# Patient Record
Sex: FEMALE | NOT HISPANIC OR LATINO | Employment: FULL TIME | URBAN - NONMETROPOLITAN AREA
[De-identification: names, ages, dates, MRNs, and addresses within clinical notes are randomized per-mention and may not be internally consistent; named-entity substitution may affect disease eponyms.]

---

## 2017-02-16 ENCOUNTER — OFFICE VISIT (OUTPATIENT)
Dept: GASTROENTEROLOGY | Facility: CLINIC | Age: 32
End: 2017-02-16

## 2017-02-16 VITALS
HEIGHT: 63 IN | DIASTOLIC BLOOD PRESSURE: 87 MMHG | RESPIRATION RATE: 14 BRPM | HEART RATE: 83 BPM | TEMPERATURE: 98.4 F | BODY MASS INDEX: 31.71 KG/M2 | WEIGHT: 179 LBS | SYSTOLIC BLOOD PRESSURE: 126 MMHG

## 2017-02-16 DIAGNOSIS — R10.13 EPIGASTRIC PAIN: Primary | Chronic | ICD-10-CM

## 2017-02-16 DIAGNOSIS — K62.5 BRIGHT RED BLOOD PER RECTUM: Chronic | ICD-10-CM

## 2017-02-16 DIAGNOSIS — R19.7 DIARRHEA, UNSPECIFIED TYPE: ICD-10-CM

## 2017-02-16 DIAGNOSIS — R13.14 PHARYNGOESOPHAGEAL DYSPHAGIA: Chronic | ICD-10-CM

## 2017-02-16 DIAGNOSIS — R11.0 NAUSEA: Chronic | ICD-10-CM

## 2017-02-16 DIAGNOSIS — K59.00 CONSTIPATION, UNSPECIFIED CONSTIPATION TYPE: Chronic | ICD-10-CM

## 2017-02-16 DIAGNOSIS — R12 HEARTBURN: Chronic | ICD-10-CM

## 2017-02-16 PROBLEM — R10.11 RIGHT UPPER QUADRANT ABDOMINAL PAIN: Status: ACTIVE | Noted: 2017-02-16

## 2017-02-16 PROBLEM — R10.33 PERIUMBILICAL ABDOMINAL PAIN: Status: ACTIVE | Noted: 2017-02-16

## 2017-02-16 PROCEDURE — 99204 OFFICE O/P NEW MOD 45 MIN: CPT | Performed by: NURSE PRACTITIONER

## 2017-02-16 RX ORDER — PROMETHAZINE HYDROCHLORIDE 25 MG/1
25 TABLET ORAL EVERY 6 HOURS PRN
COMMUNITY
Start: 2016-12-22

## 2017-02-16 RX ORDER — CITALOPRAM 20 MG/1
10 TABLET ORAL DAILY
COMMUNITY
Start: 2017-01-02

## 2017-02-16 RX ORDER — CITALOPRAM 10 MG/1
10 TABLET ORAL DAILY
COMMUNITY
End: 2017-03-07 | Stop reason: SDUPTHER

## 2017-02-16 RX ORDER — LEVOTHYROXINE AND LIOTHYRONINE 76; 18 UG/1; UG/1
120 TABLET ORAL DAILY
COMMUNITY

## 2017-02-16 NOTE — PATIENT INSTRUCTIONS
1. Antireflux measures: Avoid fried, fatty foods, alcohol, chocolate, coffee, tea,  soft drinks, peppermint and spearmint, spicy foods, tomatoes and tomato based foods, onion based foods, and smoking. Other antireflux measures include weight reduction if overweight, avoiding tight clothing around the abdomen, elevating the head of the bed 6 inches with blocks under the head board, and don't drink or eat before going to bed and avoid lying down immediately after meals.  2. Nexium 20 mg 1 tablet in the morning 30 minutes before breakfast.  3. Recommend taking Levothyroxine first in the am, wait 30 minutes, then take Nexium, wait 30 minutes, then may eat breakfast and take other medications.  4. Phenergan 25 mg 1/2-1 tablet every 8 hours as needed for nausea.  5. Check CBC, CMP, TSH, Lipase  6. Upper endoscopy-EGD: Description of the procedure, risks, benefits, alternatives and options, including nonoperative options, were discussed with the patient in detail. The patient understands and wishes to proceed.  7. Colonoscopy: Description of the procedure, risks, benefits, alternatives and options, including nonoperative options, were discussed with the patient in detail. The patient understands and wishes to proceed.

## 2017-02-16 NOTE — PROGRESS NOTES
"[address] [facility] [place] [address]    (H) [phone]  (W)     Chief Complaint   Patient presents with   • Abdominal Pain   • Diarrhea   • Constipation   • Heartburn   • Nausea   • Rectal Bleeding     The patient states she is here for evaluation of \"attacks\" she has been having over the past 1 year. She states she has had  episodes of abdominal pain, nausea, vomiting, and constipation approximately 9 times over the past year. The \"episodes\" last up to 3-4 days. She states she has had CT scans, ultrasounds, HIDA scans and nothing has explained her episodes. She has tried Bentyl but it did not help, so she has stopped this. . She states she has been taking Nexium, but it has not prevented the \"attacks\" and has not improved nausea. She states it is not helping very much with her heartburn at this time. She states she had never had problems with heartburn until this past year.     Currently the patient is having constipation for 1-2 days, then she may have a difficult time passing stools. On occasion, she may not have a bowel movement for several days, then she will randomly have diarrhea. She states she has woke up cramping in the middle of the night and will have diarrhea. She states no one else in her home is sick. There is a history of taking Augmentin for possible diverticulitis in the middle of December 2016, but the patient states she had been having the diarrhea for many months prior to taking the antibiotics. The patient states she may have bright red blood per rectum 1-2 times per week. No melena.    There is a history of intermittent difficulty swallowing liquids. The patient states she has episodes where some becomes \"choked\" on swallowing liquids. No difficulties with solids.    The patient has not had a colonoscopy in the past. There is no family history of colon cancer.    Abdominal Pain   This is a recurrent problem. The current episode started more than 1 month ago. The onset quality is sudden. " The problem occurs intermittently. The most recent episode lasted 1 day. The problem has been unchanged. The pain is located in the RUQ and periumbilical region. The pain is at a severity of 5/10. The pain is moderate. The quality of the pain is sharp. The abdominal pain does not radiate. Associated symptoms include constipation, diarrhea, headaches, hematochezia and nausea. Pertinent negatives include no arthralgias, dysuria, fever, hematuria, melena, myalgias or vomiting. Nothing aggravates the pain. The pain is relieved by nothing. She has tried proton pump inhibitors for the symptoms. The treatment provided no relief. Her past medical history is significant for GERD.   Diarrhea    This is a recurrent problem. The current episode started more than 1 month ago. The problem occurs less than 2 times per day. The problem has been unchanged. The stool consistency is described as watery. The patient states that diarrhea awakens her from sleep. Associated symptoms include abdominal pain and headaches. Pertinent negatives include no arthralgias, chills, coughing, fever, myalgias or vomiting. Nothing aggravates the symptoms. There are no known risk factors. Treatments tried: Bentyl. The treatment provided no relief.   Constipation   This is a recurrent problem. The current episode started more than 1 month ago. The problem is unchanged. Her stool frequency is 1 time per day. The stool is described as loose and firm. The patient is not on a high fiber diet. She does not exercise regularly. There has not been adequate water intake. Associated symptoms include abdominal pain, diarrhea, hematochezia and nausea. Pertinent negatives include no fever, melena or vomiting. She has tried nothing for the symptoms.   Heartburn   She complains of abdominal pain, heartburn and nausea. She reports no chest pain or no coughing. This is a recurrent problem. The current episode started more than 1 month ago. The problem occurs  occasionally. The problem has been unchanged. The heartburn duration is an hour. The heartburn is located in the substernum. The heartburn is of moderate intensity. The heartburn does not wake her from sleep. The heartburn does not limit her activity. The heartburn doesn't change with position. Nothing aggravates the symptoms. Pertinent negatives include no fatigue or melena. There are no known risk factors. She has tried a PPI for the symptoms. The treatment provided mild relief.   Nausea   This is a recurrent problem. The current episode started more than 1 month ago. The problem occurs intermittently. The problem has been unchanged. Associated symptoms include abdominal pain, headaches and nausea. Pertinent negatives include no arthralgias, chest pain, chills, coughing, fatigue, fever, joint swelling, myalgias, rash, vertigo or vomiting. Nothing aggravates the symptoms. She has tried nothing for the symptoms.   Rectal Bleeding   This is a recurrent problem. The current episode started more than 1 month ago. The problem occurs intermittently (1-2 times per week). The problem has been unchanged. Associated symptoms include abdominal pain, headaches and nausea. Pertinent negatives include no arthralgias, chest pain, chills, coughing, fatigue, fever, joint swelling, myalgias, rash, vertigo or vomiting. Nothing aggravates the symptoms. She has tried nothing for the symptoms.   Difficulty Swallowing   This is a recurrent problem. The current episode started more than 1 month ago. The problem occurs intermittently. The problem has been unchanged. Associated symptoms include abdominal pain, headaches and nausea. Pertinent negatives include no arthralgias, chest pain, chills, coughing, fatigue, fever, joint swelling, myalgias, rash, vertigo or vomiting. The symptoms are aggravated by drinking. She has tried nothing for the symptoms.     Review of Systems   Constitutional: Negative for appetite change, chills, fatigue,  fever and unexpected weight change.   HENT: Positive for trouble swallowing. Negative for mouth sores and nosebleeds.    Eyes: Negative for discharge and redness.   Respiratory: Negative for apnea, cough and shortness of breath.    Cardiovascular: Positive for palpitations. Negative for chest pain and leg swelling.   Gastrointestinal: Positive for abdominal pain, constipation, diarrhea, heartburn, hematochezia and nausea. Negative for abdominal distention, anal bleeding, blood in stool, melena and vomiting.   Endocrine: Negative for cold intolerance, heat intolerance and polydipsia.   Genitourinary: Negative for dysuria, hematuria and urgency.   Musculoskeletal: Negative for arthralgias, joint swelling and myalgias.   Skin: Negative for rash.   Allergic/Immunologic: Negative for food allergies and immunocompromised state.   Neurological: Positive for headaches. Negative for dizziness, vertigo, seizures and syncope.   Hematological: Negative for adenopathy. Does not bruise/bleed easily.   Psychiatric/Behavioral: Negative for dysphoric mood. The patient is nervous/anxious. The patient is not hyperactive.      Patient Active Problem List   Diagnosis   • Right upper quadrant abdominal pain   • Periumbilical abdominal pain   • Heartburn   • Nausea   • Constipation   • Diarrhea   • Bright red blood per rectum   • Pharyngoesophageal dysphagia     Past Medical History   Diagnosis Date   • Hypothyroid 2006   • Snores    • Tattoo      Past Surgical History   Procedure Laterality Date   • Abdominal surgery  1995     lap     Family History   Problem Relation Age of Onset   • Colon polyps Father    • ADRIANA disease Maternal Grandmother    • Diverticulitis Maternal Grandmother    • Hiatal hernia Maternal Grandmother    • Liver cancer Neg Hx    • Liver disease Neg Hx    • Stomach cancer Neg Hx    • Esophageal cancer Neg Hx      Social History   Substance Use Topics   • Smoking status: Former Smoker   • Smokeless tobacco: Not on file  "     Comment: quit in 2011   • Alcohol use Yes      Comment: social       Current Outpatient Prescriptions:   •  citalopram (CeleXA) 10 MG tablet, Take 10 mg by mouth Daily., Disp: , Rfl:   •  citalopram (CeleXA) 20 MG tablet, , Disp: , Rfl:   •  esomeprazole (nexIUM) 20 MG capsule, Take 20 mg by mouth Every Morning Before Breakfast., Disp: , Rfl:   •  promethazine (PHENERGAN) 25 MG tablet, , Disp: , Rfl:   •  Thyroid 120 MG PO tablet, Take 120 mg by mouth Daily., Disp: , Rfl:      No Known Allergies     Visit Vitals   • /87   • Pulse 83   • Temp 98.4 °F (36.9 °C)   • Resp 14   • Ht 63\" (160 cm)   • Wt 179 lb (81.2 kg)   • LMP 01/25/2017   • BMI 31.71 kg/m2     Physical Exam   Constitutional: She is oriented to person, place, and time. She appears well-developed and well-nourished. No distress.   HENT:   Head: Normocephalic and atraumatic.   Right Ear: Hearing and external ear normal.   Left Ear: Hearing and external ear normal.   Nose: Nose normal.   Mouth/Throat: Oropharynx is clear and moist and mucous membranes are normal. Mucous membranes are not pale, not dry and not cyanotic. No oral lesions. No oropharyngeal exudate.   Eyes: Conjunctivae and EOM are normal. Right eye exhibits no discharge. Left eye exhibits no discharge.   Neck: Trachea normal. Neck supple. No JVD present. No edema present. No thyroid mass and no thyromegaly present.   Cardiovascular: Normal rate, regular rhythm, S2 normal and normal heart sounds.  Exam reveals no gallop, no S3 and no friction rub.    No murmur heard.  Pulmonary/Chest: Effort normal and breath sounds normal. No respiratory distress. She exhibits no tenderness.   Abdominal: Normal appearance and bowel sounds are normal. She exhibits no distension, no ascites and no mass. There is no splenomegaly or hepatomegaly. There is tenderness (mild to moderate) in the epigastric area. There is no rigidity, no rebound and no guarding. No hernia.       Vascular Status -  Her exam " exhibits no right foot edema. Her exam exhibits no left foot edema.  Lymphadenopathy:     She has no cervical adenopathy.        Left: No supraclavicular adenopathy present.   Neurological: She is alert and oriented to person, place, and time. She has normal strength. No cranial nerve deficit or sensory deficit.   Skin: No rash noted. She is not diaphoretic. No cyanosis. No pallor. Nails show no clubbing.   Psychiatric: She has a normal mood and affect.   Nursing note and vitals reviewed.    Laboratory Tests:    Upon review of records:    Dated 12/22/2016 H. pylori IgM antibody:<9.0    Abdominal Imaging:    Upon review of records:    Ultrasound dated 12/14/2016 reveals liver parenchyma is homogenous.  There are no focal masses identified.  The gallbladder appears within normal limits.  There are no echogenic foci to suggest sounds.  There is no gallbladder wall thickening or pericholecystic fluid.  The common duct is within normal limits arguing against biliary dilatation.    Kanika was seen today for abdominal pain, diarrhea, constipation, heartburn, nausea and rectal bleeding.    Diagnoses and all orders for this visit:    Epigastric pain  Comments:  Differentials include peptic ulcer disease, pancreatobiliary disease.  Orders:  -     CBC & Differential  -     Comprehensive Metabolic Panel  -     Lipase  -     TSH    Heartburn  Comments:  Not well controlled with Nexium 20 mg daily.  Concerns for underlying Hernandez's.    Nausea  Comments:  Differentials include peptic ulcer disease, pancreatobiliary disease.  Of interest patient is not able to tolerate Zofran due to headaches.  Orders:  -     CBC & Differential  -     Comprehensive Metabolic Panel  -     Lipase  -     TSH    Constipation, unspecified constipation type  Comments:  History of recurrent constipation alternating with diarrhea.  Orders:  -     CBC & Differential  -     Comprehensive Metabolic Panel  -     Lipase  -     TSH    Diarrhea, unspecified  type  Comments:  Differentials include microscopic colitis, underlying inflammatory bowel disease.  Orders:  -     CBC & Differential  -     Comprehensive Metabolic Panel  -     Lipase  -     TSH    Bright red blood per rectum  Comments:  Differentials include hemorrhoids, fissure, vascular ectasia, underlying inflammatory bowel disease.  Orders:  -     CBC & Differential  -     Comprehensive Metabolic Panel  -     Lipase  -     TSH    Pharyngoesophageal dysphagia  Comments:  History of dysphagia symptoms with liquids.  No difficulty with solids.  Differentials include esophageal dysmotility, esophagitis.       Plan   Patient Instructions   1. Antireflux measures: Avoid fried, fatty foods, alcohol, chocolate, coffee, tea,  soft drinks, peppermint and spearmint, spicy foods, tomatoes and tomato based foods, onion based foods, and smoking. Other antireflux measures include weight reduction if overweight, avoiding tight clothing around the abdomen, elevating the head of the bed 6 inches with blocks under the head board, and don't drink or eat before going to bed and avoid lying down immediately after meals.  2. Nexium 20 mg 1 tablet in the morning 30 minutes before breakfast.  3. Recommend taking Levothyroxine first in the am, wait 30 minutes, then take Nexium, wait 30 minutes, then may eat breakfast and take other medications.  4. Phenergan 25 mg 1/2-1 tablet every 8 hours as needed for nausea.  5. Check CBC, CMP, TSH, Lipase  6. Upper endoscopy-EGD: Description of the procedure, risks, benefits, alternatives and options, including nonoperative options, were discussed with the patient in detail. The patient understands and wishes to proceed.  7. Colonoscopy: Description of the procedure, risks, benefits, alternatives and options, including nonoperative options, were discussed with the patient in detail. The patient understands and wishes to proceed.    Patient Care Team:  RAMILA Garcia as PCP - General  (Family Medicine)    Maylin Albert, APRN

## 2017-02-20 ENCOUNTER — PREP FOR SURGERY (OUTPATIENT)
Dept: GASTROENTEROLOGY | Facility: CLINIC | Age: 32
End: 2017-02-20

## 2017-02-20 DIAGNOSIS — R19.7 DIARRHEA, UNSPECIFIED TYPE: ICD-10-CM

## 2017-02-20 DIAGNOSIS — K62.5 BRIGHT RED BLOOD PER RECTUM: ICD-10-CM

## 2017-02-20 DIAGNOSIS — K59.00 CONSTIPATION, UNSPECIFIED CONSTIPATION TYPE: Primary | ICD-10-CM

## 2017-02-20 RX ORDER — SODIUM CHLORIDE 0.9 % (FLUSH) 0.9 %
1-10 SYRINGE (ML) INJECTION AS NEEDED
Status: CANCELLED | OUTPATIENT
Start: 2017-02-20

## 2017-02-20 RX ORDER — SODIUM CHLORIDE 9 MG/ML
70 INJECTION, SOLUTION INTRAVENOUS CONTINUOUS PRN
Status: CANCELLED | OUTPATIENT
Start: 2017-02-20

## 2017-03-08 ENCOUNTER — TELEPHONE (OUTPATIENT)
Dept: GASTROENTEROLOGY | Facility: CLINIC | Age: 32
End: 2017-03-08

## 2017-03-09 ENCOUNTER — PREP FOR SURGERY (OUTPATIENT)
Dept: GASTROENTEROLOGY | Facility: CLINIC | Age: 32
End: 2017-03-09

## 2017-03-09 ENCOUNTER — TELEPHONE (OUTPATIENT)
Dept: GASTROENTEROLOGY | Facility: CLINIC | Age: 32
End: 2017-03-09

## 2017-03-09 NOTE — TELEPHONE ENCOUNTER
Patient aware, She is informed that she needs to make an appt and fup with her PCP for below normal thyroid levels.  She states understanding.

## 2017-03-09 NOTE — TELEPHONE ENCOUNTER
----- Message from RAMILA Merritt sent at 3/8/2017  1:45 PM EST -----  Please let patient know her TSH (thyroid) is below normal. She needs to follow up with her PCP regarding this. Other labs were ok.    Thank you,  Maylin    ----- Message -----     From: Jasmyn Valdes     Sent: 3/8/2017   1:10 PM       To: RAMILA Merritt    PT RETURNED MISSED CALL FROM YOU, [phone]

## 2017-03-10 ENCOUNTER — ANESTHESIA EVENT (OUTPATIENT)
Dept: GASTROENTEROLOGY | Facility: HOSPITAL | Age: 32
End: 2017-03-10

## 2017-03-10 ENCOUNTER — HOSPITAL ENCOUNTER (OUTPATIENT)
Facility: HOSPITAL | Age: 32
Setting detail: HOSPITAL OUTPATIENT SURGERY
Discharge: HOME | End: 2017-03-10
Attending: INTERNAL MEDICINE | Admitting: INTERNAL MEDICINE

## 2017-03-10 ENCOUNTER — ANESTHESIA (OUTPATIENT)
Dept: GASTROENTEROLOGY | Facility: HOSPITAL | Age: 32
End: 2017-03-10

## 2017-03-10 VITALS
HEART RATE: 74 BPM | RESPIRATION RATE: 19 BRPM | TEMPERATURE: 98 F | SYSTOLIC BLOOD PRESSURE: 111 MMHG | WEIGHT: 179 LBS | OXYGEN SATURATION: 100 % | DIASTOLIC BLOOD PRESSURE: 64 MMHG | HEIGHT: 63 IN | BODY MASS INDEX: 31.71 KG/M2

## 2017-03-10 DIAGNOSIS — R19.7 DIARRHEA, UNSPECIFIED TYPE: ICD-10-CM

## 2017-03-10 DIAGNOSIS — K62.5 BRIGHT RED BLOOD PER RECTUM: ICD-10-CM

## 2017-03-10 DIAGNOSIS — K59.00 CONSTIPATION, UNSPECIFIED CONSTIPATION TYPE: ICD-10-CM

## 2017-03-10 LAB — B-HCG UR QL: NEGATIVE

## 2017-03-10 PROCEDURE — 25010000002 PROPOFOL 200 MG/20ML EMULSION: Performed by: NURSE ANESTHETIST, CERTIFIED REGISTERED

## 2017-03-10 PROCEDURE — 25010000002 ONDANSETRON PER 1 MG: Performed by: NURSE ANESTHETIST, CERTIFIED REGISTERED

## 2017-03-10 PROCEDURE — 25010000002 MEPERIDINE PER 100 MG: Performed by: NURSE ANESTHETIST, CERTIFIED REGISTERED

## 2017-03-10 PROCEDURE — 45384 COLONOSCOPY W/LESION REMOVAL: CPT | Performed by: INTERNAL MEDICINE

## 2017-03-10 PROCEDURE — 81025 URINE PREGNANCY TEST: CPT | Performed by: NURSE PRACTITIONER

## 2017-03-10 PROCEDURE — 25010000002 ONDANSETRON PER 1 MG

## 2017-03-10 PROCEDURE — 25010000002 MIDAZOLAM PER 1 MG: Performed by: NURSE ANESTHETIST, CERTIFIED REGISTERED

## 2017-03-10 PROCEDURE — 43239 EGD BIOPSY SINGLE/MULTIPLE: CPT | Performed by: INTERNAL MEDICINE

## 2017-03-10 RX ORDER — SODIUM CHLORIDE 0.9 % (FLUSH) 0.9 %
1-10 SYRINGE (ML) INJECTION AS NEEDED
Status: DISCONTINUED | OUTPATIENT
Start: 2017-03-10 | End: 2017-03-10 | Stop reason: HOSPADM

## 2017-03-10 RX ORDER — PANTOPRAZOLE SODIUM 40 MG/10ML
40 INJECTION, POWDER, LYOPHILIZED, FOR SOLUTION INTRAVENOUS
Status: DISCONTINUED | OUTPATIENT
Start: 2017-03-11 | End: 2017-03-10 | Stop reason: HOSPADM

## 2017-03-10 RX ORDER — ONDANSETRON 2 MG/ML
INJECTION INTRAMUSCULAR; INTRAVENOUS
Status: COMPLETED
Start: 2017-03-10 | End: 2017-03-10

## 2017-03-10 RX ORDER — SODIUM CHLORIDE 9 MG/ML
70 INJECTION, SOLUTION INTRAVENOUS CONTINUOUS PRN
Status: DISCONTINUED | OUTPATIENT
Start: 2017-03-10 | End: 2017-03-10 | Stop reason: HOSPADM

## 2017-03-10 RX ORDER — PROPOFOL 10 MG/ML
INJECTION, EMULSION INTRAVENOUS AS NEEDED
Status: DISCONTINUED | OUTPATIENT
Start: 2017-03-10 | End: 2017-03-10 | Stop reason: SURG

## 2017-03-10 RX ORDER — ESOMEPRAZOLE MAGNESIUM 40 MG/1
CAPSULE, DELAYED RELEASE ORAL
Qty: 30 CAPSULE | Refills: 3 | Status: SHIPPED | OUTPATIENT
Start: 2017-03-10 | End: 2017-04-11

## 2017-03-10 RX ORDER — MEPERIDINE HYDROCHLORIDE 50 MG/ML
INJECTION INTRAMUSCULAR; INTRAVENOUS; SUBCUTANEOUS AS NEEDED
Status: DISCONTINUED | OUTPATIENT
Start: 2017-03-10 | End: 2017-03-10 | Stop reason: SURG

## 2017-03-10 RX ORDER — ONDANSETRON 2 MG/ML
4 INJECTION INTRAMUSCULAR; INTRAVENOUS ONCE AS NEEDED
Status: COMPLETED | OUTPATIENT
Start: 2017-03-10 | End: 2017-03-10

## 2017-03-10 RX ORDER — MIDAZOLAM HYDROCHLORIDE 1 MG/ML
INJECTION INTRAMUSCULAR; INTRAVENOUS AS NEEDED
Status: DISCONTINUED | OUTPATIENT
Start: 2017-03-10 | End: 2017-03-10 | Stop reason: SURG

## 2017-03-10 RX ADMIN — PROPOFOL 50 MG: 10 INJECTION, EMULSION INTRAVENOUS at 11:39

## 2017-03-10 RX ADMIN — PROPOFOL 50 MG: 10 INJECTION, EMULSION INTRAVENOUS at 12:15

## 2017-03-10 RX ADMIN — PROPOFOL 50 MG: 10 INJECTION, EMULSION INTRAVENOUS at 11:40

## 2017-03-10 RX ADMIN — SODIUM CHLORIDE 70 ML/HR: 9 INJECTION, SOLUTION INTRAVENOUS at 09:44

## 2017-03-10 RX ADMIN — ONDANSETRON 4 MG: 2 INJECTION INTRAMUSCULAR; INTRAVENOUS at 11:30

## 2017-03-10 RX ADMIN — PROPOFOL 30 MG: 10 INJECTION, EMULSION INTRAVENOUS at 12:00

## 2017-03-10 RX ADMIN — ONDANSETRON 4 MG: 2 INJECTION INTRAMUSCULAR; INTRAVENOUS at 11:00

## 2017-03-10 RX ADMIN — MEPERIDINE HYDROCHLORIDE 50 MG: 50 INJECTION INTRAMUSCULAR; INTRAVENOUS; SUBCUTANEOUS at 11:30

## 2017-03-10 RX ADMIN — PROPOFOL 30 MG: 10 INJECTION, EMULSION INTRAVENOUS at 12:09

## 2017-03-10 RX ADMIN — MIDAZOLAM HYDROCHLORIDE 2 MG: 1 INJECTION, SOLUTION INTRAMUSCULAR; INTRAVENOUS at 11:29

## 2017-03-10 RX ADMIN — PROPOFOL 60 MG: 10 INJECTION, EMULSION INTRAVENOUS at 11:45

## 2017-03-10 RX ADMIN — PROPOFOL 50 MG: 10 INJECTION, EMULSION INTRAVENOUS at 11:50

## 2017-03-10 RX ADMIN — PROPOFOL 30 MG: 10 INJECTION, EMULSION INTRAVENOUS at 12:05

## 2017-03-10 RX ADMIN — MIDAZOLAM HYDROCHLORIDE 2 MG: 1 INJECTION, SOLUTION INTRAMUSCULAR; INTRAVENOUS at 11:50

## 2017-03-10 RX ADMIN — PROPOFOL 50 MG: 10 INJECTION, EMULSION INTRAVENOUS at 12:20

## 2017-03-10 NOTE — TELEPHONE ENCOUNTER
Left message on voicemail for patient to return call to discuss labs.    Patient has not called back. She has follow up 4/11/2017 and will discuss with patient at that time.

## 2017-03-10 NOTE — PLAN OF CARE
Problem: GI Endoscopy (Adult)  Goal: Signs and Symptoms of Listed Potential Problems Will be Absent or Manageable (GI Endoscopy)  Outcome: Ongoing (interventions implemented as appropriate)    03/10/17 0966   GI Endoscopy   Problems Assessed (GI Endoscopy) all   Problems Present (GI Endoscopy) none

## 2017-03-10 NOTE — OP NOTE
PROCEDURE:  Upper Endoscopy with 4 cold biopsy polypectomies, and biopsies.    DATE OF PROCEDURE: March 10, 2017.    REFERRING PROVIDER:  RAMILA Gibson.  [place], [place].     INSTRUMENT:  Olympus GIF-H190 video endoscope.       INDICATIONS OF THE PROCEDURE:    This is a 32-year-old white female with history of recurrent epigastric abdominal pain, and the reflux.  Currently undergoing upper endoscopy for further evaluation.     BIOPSIES: Second portion of duodenum.  Gastric antrum, angularis and greater curvature of the stomach.  4 cold biopsy polypectomies were performed at the body of the stomach.     MEDICATIONS:  MAC.     PHOTOGRAPHS:  Photographs were included in the medical records.     CONSENT/PREPROCEDURE EVALUATION:  Risks, benefits, alternatives and options of the procedure including risks of anesthesia/sedation were discussed and informed consent was obtained prior to the procedure. History and physical examination were performed and nothing precluded the test.     REPORT:  The patient was placed in left lateral decubitus position. Once under the influence of IV sedation, the instrument was inserted into the mouth and esophagus was intubated under direct vision without difficulty.     Esophagus:  Z line was noted to be around 40  cm.    A small sliding hiatal hernia less than 3 cm was noted.  No Hernandez's esophagus was seen.  Erythematous distal esophagitis.     Stomach:  Antrum:  Erythematous gastritis.  Angulus, lesser and greater curves: .  Lesser and greater curves areas of erythema were noted.  Additionally evidence of healed ulceration was seen along the greater curvature.  Retroflex examination: Sliding hiatal hernia.  Cardia and fundus:  Normal.     Body of the stomach: Erythematous gastritis.  Good distensibility of the stomach was achieved no giant folds were noted.   Biopsies were obtained from the gastric antrum, angularis and body of the stomach.    Pylorus and pyloric channel:   normal.     Duodenum:  Bulb: Mild focal erythema within the duodenal bulb polyp..  Second portion: Possible early scalloping was seen in the second portion of duodenum.  Biopsies were obtained from the second portion of duodenum.    Intervention:  None.       The upper GI tract was decompressed and the scope was pulled out of the patient. The patient tolerated the procedure well.     DIAGNOSES: Erythematous distal esophagitis.  No Hernandez's esophagus.  2.  A small sliding hiatal hernia less than 3 cm.  3.  Erythematous gastritis with evidence of healed ulceration.  4.  Mild erythematous bulbar duodenitis.     RECOMMENDATIONS:  1.  Dietary instruction  2.  Nexium. q.a.m. 1/2 hour before breakfast. May discontinue Nexium 20.   3.  Follow biopsies.  4.  Follow up in office.     Thank you very much for letting me participate in the care of this patient. Please do not hesitate to call me if you have any questions.

## 2017-03-10 NOTE — DISCHARGE INSTRUCTIONS
Follow the instructions below marked X upon discharge.    Diet:     Follow the instructions below marked X upon discharge.    Diet:   x___  Nothing by mouth until fully alert.  x___  Then if no nausea vomiting or abdominal pain may have  x___  Clear liquids diet (No Sodas) for 30 minutes.  x___  May advance to low residue-low lactose diet.  x___  Limit Coffee, Chocolate, Fried Foods,   x___  Avoid Sodas,High Fat Foods, Cookies, Excessive Tomato or Onions, Alcohol and Smoking).    Blood Thinner Directions:    x___  Avoid Aspirin & other NSAIDS for _7__ days.  Tylenol is okay.    Call [facility] at [phone] or come to the Emergency   Department if you experience the following: Chest pain, abdominal pain, bleeding  (vomiting of blood or coffee colored material, black stools or maria fernanda blood in stools),   fever/chills, nausea and vomiting or dizziness.  Follow-up:Office phone # [phone].  Please make an appointment with STACIE YEUNG MD in  3-4 weeks, or Keep appointment if the patient has one.      To assist you in voiding:  Drink plenty of fluids  Listen to running water while attempting to void.  If you are unable to urinate and you have an uncomfortable urge to void or it has been   6 hours since you were discharged, return to the Emergency RoomRest today  No pushing,pulling,tugging,heavy lifting, or strenuous activity   No major decision making,driving,or drinking alcoholic beverages for 24 hours due to the sedation you received  Always use good hand hygiene/washing technique  No driving on pain medication

## 2017-03-10 NOTE — OP NOTE
PROCEDURE:  Colonoscopy to the terminal ileum with 17 hot biopsy polypectomies, and biopsies.    DATE OF PROCEDURE:  3/10/2017    REFERRING PROVIDER:  RAMILA Gibson.  [place], [place].     INSTRUMENT USED:   Olympus PCF H 190 videocolonoscope.     INDICATIONS OF THE PROCEDURE:  This is a 32-year-old white female for colon cancer screening.  There is history of irregular bowel habits described as diarrhea and at times constipation.  There is history of bright red blood per rectum.      BIOPSIES:  Multiple biopsies were obtained from the terminal ileum.  Random biopsies were obtained from the colon including rectum.  17 hot biopsy polypectomies were performed 6 in descending colon and 11 in sigmoid colon.      PHOTOGRAPHS:  Photographs were included in the medical records.     MEDICATIONS:  MAC.       CONSENT/PREPROCEDURE EVALUATION:  Risks, benefits, alternatives and options of the procedure including risks of sedation/anesthesia were discussed with the patient and informed consent was obtained prior to the procedure.  History and physical examination were performed and nothing precluded the test.      REPORT:  The patient was placed in left lateral decubitus position and a digital examination was performed.  Once under the influence of IV sedation, the instrument was inserted into the rectum and advanced under direct vision to cecum which was identified by the ileocecal valve, triradiate folds and appendiceal orifice. The scope was then maneuvered into the terminal ileum.        FINDINGS:      Digital rectal examination:  Good anal tone.  No perianal pathology.  No mass.        Terminal ileum:  8-9 cm. In the very distal portion of the terminal ileum area of erythema, erosions and nodularity was seen.  Multiple biopsies were obtained in this area.        Cecum and ascending colon: Normal.       Hepatic flexure, transverse colon, splenic flexure:  Normal.         Descending colon, sigmoid colon and  rectum: Scant early diverticular change was noted.  Multiple 3-4 mm sessile polyps were seen in the descending colon and sigmoid colon.  6 were removed in the descending colon level in the sigmoid colon with hot biopsy forceps. No endoscopic evidence of colitis was seen.  Random biopsies were obtained from the colon upon withdrawal of the scope. A retroflex examination within the rectum revealed internal hemorrhoids.        The scope was then straightened, the lower GI tract was decompressed, and the scope was pulled out of the patient.  The patient tolerated the procedure well.  There were no immediate complications and the patient was transferred in stable condition for post procedure observation.       TECHNICAL DATA: [place] prep score: 9 (3+3+3).    Difficulty of examination:  Average.   Withdrawal time: 8 min.  Retroflex examination in right colon: Yes.  Second look Rectum to cecum with decompression: Yes.    DIAGNOSES:    Focal erythema, and scant erosion within the terminal ileum-terminal ileitis.  Scant early diverticular change in the left colon.  Colon polyps.  Internal hemorrhoids.  No endoscopic evidence of colitis was seen.  Random biopsies were obtained from the colon upon withdrawal of the scope.      RECOMMENDATIONS:     Follow biopsies.    Follow-up:  In office in 3-4 weeks.  Followup colonoscopy in 5 years.     Dietary instructions.     Thank you very much for letting me participate in the care of this patient. Please do not hesitate to call me if you have any questions.

## 2017-03-10 NOTE — ANESTHESIA POSTPROCEDURE EVALUATION
Patient: Kanika Bullock    Procedure Summary     Date Anesthesia Start Anesthesia Stop Room / Location    03/10/17 1127 1231 [facility] ENDOSCOPY 2 / [facility] ENDOSCOPY       Procedure Diagnosis Surgeon Provider    COLONOSCOPY WITH COLD BXS, POLYPECTOMY (N/A Anus); ESOPHAGOGASTRODUODENOSCOPY WITH BXS (N/A Esophagus) Diverticulosis; Colon polyps; Internal hemorrhoid; Ileitis; Esophagitis; Hiatal hernia; Gastritis; Gastric polyps; Duodenitis  (Bright red blood per rectum [K62.5]; Constipation, unspecified constipation type [K59.00]; Diarrhea, unspecified type [R19.7]) MD Iftikhar Levy CRNA          Anesthesia Type: MAC  Last vitals  BP   92/50   Temp      Pulse  83   Resp  16    SpO2   96% RA     Post Anesthesia Care and Evaluation    Patient location during evaluation: PHASE II  Patient participation: complete - patient participated  Level of consciousness: awake and alert  Pain score: 0  Pain management: adequate  Airway patency: patent  Anesthetic complications: No anesthetic complications  PONV Status: none  Cardiovascular status: blood pressure returned to baseline  Respiratory status: acceptable  Hydration status: acceptable

## 2017-03-10 NOTE — PLAN OF CARE
Problem: GI Endoscopy (Adult)  Goal: Signs and Symptoms of Listed Potential Problems Will be Absent or Manageable (GI Endoscopy)  Outcome: Ongoing (interventions implemented as appropriate)    03/10/17 0937   GI Endoscopy   Problems Assessed (GI Endoscopy) all   Problems Present (GI Endoscopy) none

## 2017-03-10 NOTE — H&P (VIEW-ONLY)
"[address] [facility] [address]    (H) [phone]  (W)     Chief Complaint   Patient presents with   • Abdominal Pain   • Diarrhea   • Constipation   • Heartburn   • Nausea   • Rectal Bleeding     The patient states she is here for evaluation of \"attacks\" she has been having over the past 1 year. She states she has had  episodes of abdominal pain, nausea, vomiting, and constipation approximately 9 times over the past year. The \"episodes\" last up to 3-4 days. She states she has had CT scans, ultrasounds, HIDA scans and nothing has explained her episodes. She has tried Bentyl but it did not help, so she has stopped this. . She states she has been taking Nexium, but it has not prevented the \"attacks\" and has not improved nausea. She states it is not helping very much with her heartburn at this time. She states she had never had problems with heartburn until this past year.     Currently the patient is having constipation for 1-2 days, then she may have a difficult time passing stools. On occasion, she may not have a bowel movement for several days, then she will randomly have diarrhea. She states she has woke up cramping in the middle of the night and will have diarrhea. She states no one else in her home is sick. There is a history of taking Augmentin for possible diverticulitis in the middle of December 2016, but the patient states she had been having the diarrhea for many months prior to taking the antibiotics. The patient states she may have bright red blood per rectum 1-2 times per week. No melena.    There is a history of intermittent difficulty swallowing liquids. The patient states she has episodes where some becomes \"choked\" on swallowing liquids. No difficulties with solids.    The patient has not had a colonoscopy in the past. There is no family history of colon cancer.    Abdominal Pain   This is a recurrent problem. The current episode started more than 1 month ago. The onset quality is sudden. " The problem occurs intermittently. The most recent episode lasted 1 day. The problem has been unchanged. The pain is located in the RUQ and periumbilical region. The pain is at a severity of 5/10. The pain is moderate. The quality of the pain is sharp. The abdominal pain does not radiate. Associated symptoms include constipation, diarrhea, headaches, hematochezia and nausea. Pertinent negatives include no arthralgias, dysuria, fever, hematuria, melena, myalgias or vomiting. Nothing aggravates the pain. The pain is relieved by nothing. She has tried proton pump inhibitors for the symptoms. The treatment provided no relief. Her past medical history is significant for GERD.   Diarrhea    This is a recurrent problem. The current episode started more than 1 month ago. The problem occurs less than 2 times per day. The problem has been unchanged. The stool consistency is described as watery. The patient states that diarrhea awakens her from sleep. Associated symptoms include abdominal pain and headaches. Pertinent negatives include no arthralgias, chills, coughing, fever, myalgias or vomiting. Nothing aggravates the symptoms. There are no known risk factors. Treatments tried: Bentyl. The treatment provided no relief.   Constipation   This is a recurrent problem. The current episode started more than 1 month ago. The problem is unchanged. Her stool frequency is 1 time per day. The stool is described as loose and firm. The patient is not on a high fiber diet. She does not exercise regularly. There has not been adequate water intake. Associated symptoms include abdominal pain, diarrhea, hematochezia and nausea. Pertinent negatives include no fever, melena or vomiting. She has tried nothing for the symptoms.   Heartburn   She complains of abdominal pain, heartburn and nausea. She reports no chest pain or no coughing. This is a recurrent problem. The current episode started more than 1 month ago. The problem occurs  occasionally. The problem has been unchanged. The heartburn duration is an hour. The heartburn is located in the substernum. The heartburn is of moderate intensity. The heartburn does not wake her from sleep. The heartburn does not limit her activity. The heartburn doesn't change with position. Nothing aggravates the symptoms. Pertinent negatives include no fatigue or melena. There are no known risk factors. She has tried a PPI for the symptoms. The treatment provided mild relief.   Nausea   This is a recurrent problem. The current episode started more than 1 month ago. The problem occurs intermittently. The problem has been unchanged. Associated symptoms include abdominal pain, headaches and nausea. Pertinent negatives include no arthralgias, chest pain, chills, coughing, fatigue, fever, joint swelling, myalgias, rash, vertigo or vomiting. Nothing aggravates the symptoms. She has tried nothing for the symptoms.   Rectal Bleeding   This is a recurrent problem. The current episode started more than 1 month ago. The problem occurs intermittently (1-2 times per week). The problem has been unchanged. Associated symptoms include abdominal pain, headaches and nausea. Pertinent negatives include no arthralgias, chest pain, chills, coughing, fatigue, fever, joint swelling, myalgias, rash, vertigo or vomiting. Nothing aggravates the symptoms. She has tried nothing for the symptoms.   Difficulty Swallowing   This is a recurrent problem. The current episode started more than 1 month ago. The problem occurs intermittently. The problem has been unchanged. Associated symptoms include abdominal pain, headaches and nausea. Pertinent negatives include no arthralgias, chest pain, chills, coughing, fatigue, fever, joint swelling, myalgias, rash, vertigo or vomiting. The symptoms are aggravated by drinking. She has tried nothing for the symptoms.     Review of Systems   Constitutional: Negative for appetite change, chills, fatigue,  fever and unexpected weight change.   HENT: Positive for trouble swallowing. Negative for mouth sores and nosebleeds.    Eyes: Negative for discharge and redness.   Respiratory: Negative for apnea, cough and shortness of breath.    Cardiovascular: Positive for palpitations. Negative for chest pain and leg swelling.   Gastrointestinal: Positive for abdominal pain, constipation, diarrhea, heartburn, hematochezia and nausea. Negative for abdominal distention, anal bleeding, blood in stool, melena and vomiting.   Endocrine: Negative for cold intolerance, heat intolerance and polydipsia.   Genitourinary: Negative for dysuria, hematuria and urgency.   Musculoskeletal: Negative for arthralgias, joint swelling and myalgias.   Skin: Negative for rash.   Allergic/Immunologic: Negative for food allergies and immunocompromised state.   Neurological: Positive for headaches. Negative for dizziness, vertigo, seizures and syncope.   Hematological: Negative for adenopathy. Does not bruise/bleed easily.   Psychiatric/Behavioral: Negative for dysphoric mood. The patient is nervous/anxious. The patient is not hyperactive.      Patient Active Problem List   Diagnosis   • Right upper quadrant abdominal pain   • Periumbilical abdominal pain   • Heartburn   • Nausea   • Constipation   • Diarrhea   • Bright red blood per rectum   • Pharyngoesophageal dysphagia     Past Medical History   Diagnosis Date   • Hypothyroid 2006   • Snores    • Tattoo      Past Surgical History   Procedure Laterality Date   • Abdominal surgery  1995     lap     Family History   Problem Relation Age of Onset   • Colon polyps Father    • ADRIANA disease Maternal Grandmother    • Diverticulitis Maternal Grandmother    • Hiatal hernia Maternal Grandmother    • Liver cancer Neg Hx    • Liver disease Neg Hx    • Stomach cancer Neg Hx    • Esophageal cancer Neg Hx      Social History   Substance Use Topics   • Smoking status: Former Smoker   • Smokeless tobacco: Not on file  "     Comment: quit in 2011   • Alcohol use Yes      Comment: social       Current Outpatient Prescriptions:   •  citalopram (CeleXA) 10 MG tablet, Take 10 mg by mouth Daily., Disp: , Rfl:   •  citalopram (CeleXA) 20 MG tablet, , Disp: , Rfl:   •  esomeprazole (nexIUM) 20 MG capsule, Take 20 mg by mouth Every Morning Before Breakfast., Disp: , Rfl:   •  promethazine (PHENERGAN) 25 MG tablet, , Disp: , Rfl:   •  Thyroid 120 MG PO tablet, Take 120 mg by mouth Daily., Disp: , Rfl:      No Known Allergies     Visit Vitals   • /87   • Pulse 83   • Temp 98.4 °F (36.9 °C)   • Resp 14   • Ht 63\" (160 cm)   • Wt 179 lb (81.2 kg)   • LMP 01/25/2017   • BMI 31.71 kg/m2     Physical Exam   Constitutional: She is oriented to person, place, and time. She appears well-developed and well-nourished. No distress.   HENT:   Head: Normocephalic and atraumatic.   Right Ear: Hearing and external ear normal.   Left Ear: Hearing and external ear normal.   Nose: Nose normal.   Mouth/Throat: Oropharynx is clear and moist and mucous membranes are normal. Mucous membranes are not pale, not dry and not cyanotic. No oral lesions. No oropharyngeal exudate.   Eyes: Conjunctivae and EOM are normal. Right eye exhibits no discharge. Left eye exhibits no discharge.   Neck: Trachea normal. Neck supple. No JVD present. No edema present. No thyroid mass and no thyromegaly present.   Cardiovascular: Normal rate, regular rhythm, S2 normal and normal heart sounds.  Exam reveals no gallop, no S3 and no friction rub.    No murmur heard.  Pulmonary/Chest: Effort normal and breath sounds normal. No respiratory distress. She exhibits no tenderness.   Abdominal: Normal appearance and bowel sounds are normal. She exhibits no distension, no ascites and no mass. There is no splenomegaly or hepatomegaly. There is tenderness (mild to moderate) in the epigastric area. There is no rigidity, no rebound and no guarding. No hernia.       Vascular Status -  Her exam " exhibits no right foot edema. Her exam exhibits no left foot edema.  Lymphadenopathy:     She has no cervical adenopathy.        Left: No supraclavicular adenopathy present.   Neurological: She is alert and oriented to person, place, and time. She has normal strength. No cranial nerve deficit or sensory deficit.   Skin: No rash noted. She is not diaphoretic. No cyanosis. No pallor. Nails show no clubbing.   Psychiatric: She has a normal mood and affect.   Nursing note and vitals reviewed.    Laboratory Tests:    Upon review of records:    Dated 12/22/2016 H. pylori IgM antibody:<9.0    Abdominal Imaging:    Upon review of records:    Ultrasound dated 12/14/2016 reveals liver parenchyma is homogenous.  There are no focal masses identified.  The gallbladder appears within normal limits.  There are no echogenic foci to suggest sounds.  There is no gallbladder wall thickening or pericholecystic fluid.  The common duct is within normal limits arguing against biliary dilatation.    Kanika was seen today for abdominal pain, diarrhea, constipation, heartburn, nausea and rectal bleeding.    Diagnoses and all orders for this visit:    Epigastric pain  Comments:  Differentials include peptic ulcer disease, pancreatobiliary disease.  Orders:  -     CBC & Differential  -     Comprehensive Metabolic Panel  -     Lipase  -     TSH    Heartburn  Comments:  Not well controlled with Nexium 20 mg daily.  Concerns for underlying Hernandez's.    Nausea  Comments:  Differentials include peptic ulcer disease, pancreatobiliary disease.  Of interest patient is not able to tolerate Zofran due to headaches.  Orders:  -     CBC & Differential  -     Comprehensive Metabolic Panel  -     Lipase  -     TSH    Constipation, unspecified constipation type  Comments:  History of recurrent constipation alternating with diarrhea.  Orders:  -     CBC & Differential  -     Comprehensive Metabolic Panel  -     Lipase  -     TSH    Diarrhea, unspecified  type  Comments:  Differentials include microscopic colitis, underlying inflammatory bowel disease.  Orders:  -     CBC & Differential  -     Comprehensive Metabolic Panel  -     Lipase  -     TSH    Bright red blood per rectum  Comments:  Differentials include hemorrhoids, fissure, vascular ectasia, underlying inflammatory bowel disease.  Orders:  -     CBC & Differential  -     Comprehensive Metabolic Panel  -     Lipase  -     TSH    Pharyngoesophageal dysphagia  Comments:  History of dysphagia symptoms with liquids.  No difficulty with solids.  Differentials include esophageal dysmotility, esophagitis.       Plan   Patient Instructions   1. Antireflux measures: Avoid fried, fatty foods, alcohol, chocolate, coffee, tea,  soft drinks, peppermint and spearmint, spicy foods, tomatoes and tomato based foods, onion based foods, and smoking. Other antireflux measures include weight reduction if overweight, avoiding tight clothing around the abdomen, elevating the head of the bed 6 inches with blocks under the head board, and don't drink or eat before going to bed and avoid lying down immediately after meals.  2. Nexium 20 mg 1 tablet in the morning 30 minutes before breakfast.  3. Recommend taking Levothyroxine first in the am, wait 30 minutes, then take Nexium, wait 30 minutes, then may eat breakfast and take other medications.  4. Phenergan 25 mg 1/2-1 tablet every 8 hours as needed for nausea.  5. Check CBC, CMP, TSH, Lipase  6. Upper endoscopy-EGD: Description of the procedure, risks, benefits, alternatives and options, including nonoperative options, were discussed with the patient in detail. The patient understands and wishes to proceed.  7. Colonoscopy: Description of the procedure, risks, benefits, alternatives and options, including nonoperative options, were discussed with the patient in detail. The patient understands and wishes to proceed.    Patient Care Team:  RAMILA Garcia as PCP - General  (Family Medicine)    Maylin Albert, APRN

## 2017-03-10 NOTE — PLAN OF CARE
Problem: GI Endoscopy (Adult)  Goal: Signs and Symptoms of Listed Potential Problems Will be Absent or Manageable (GI Endoscopy)  Outcome: Outcome(s) achieved Date Met:  03/10/17    03/10/17 1241   GI Endoscopy   Problems Assessed (GI Endoscopy) all   Problems Present (GI Endoscopy) none

## 2017-03-10 NOTE — ANESTHESIA PREPROCEDURE EVALUATION
Anesthesia Evaluation     Patient summary reviewed and Nursing notes reviewed   NPO Status: > 8 hours   Airway   Mallampati: I  Neck ROM: full  no difficulty expected  Dental      Pulmonary - normal exam   Cardiovascular - normal exam        Neuro/Psych  (+) psychiatric history Anxiety,    GI/Hepatic/Renal/Endo    (+)  GERD, hypothyroidism,     Musculoskeletal     Abdominal    Substance History      OB/GYN          Other                                    Anesthesia Plan    ASA 2     MAC     intravenous induction   Anesthetic plan and risks discussed with patient.

## 2017-03-16 LAB
LAB AP CASE REPORT: NORMAL
Lab: NORMAL
PATH REPORT.FINAL DX SPEC: NORMAL

## 2017-04-11 ENCOUNTER — OFFICE VISIT (OUTPATIENT)
Dept: GASTROENTEROLOGY | Facility: CLINIC | Age: 32
End: 2017-04-11

## 2017-04-11 VITALS
HEART RATE: 90 BPM | TEMPERATURE: 98.1 F | RESPIRATION RATE: 19 BRPM | BODY MASS INDEX: 30.48 KG/M2 | WEIGHT: 172 LBS | SYSTOLIC BLOOD PRESSURE: 115 MMHG | HEIGHT: 63 IN | DIASTOLIC BLOOD PRESSURE: 76 MMHG

## 2017-04-11 DIAGNOSIS — R11.0 NAUSEA: ICD-10-CM

## 2017-04-11 DIAGNOSIS — R12 HEARTBURN: ICD-10-CM

## 2017-04-11 DIAGNOSIS — R19.7 DIARRHEA, UNSPECIFIED TYPE: Primary | ICD-10-CM

## 2017-04-11 DIAGNOSIS — K63.5 COLON POLYP: ICD-10-CM

## 2017-04-11 PROCEDURE — 99214 OFFICE O/P EST MOD 30 MIN: CPT | Performed by: INTERNAL MEDICINE

## 2017-04-11 NOTE — PATIENT INSTRUCTIONS
1. Antireflux measures.  2. Low-fat-moderate lactose-moderate fiber diet.  3. Regular low impact exercise and weight loss.  4. Dose adjustment may be considered in terms of thyroid supplements.  5. Brief trial of Nexium 20 mg one by mouth every morning one half hour before breakfast.  6. The patient should take thyroid medication first thing in the morning, then wait for half hour then take Nexium then wait for another half hour and then eat.   7. Follow-up colonoscopy is recommended in 3 years-March 2020 to reevaluate the terminal ileum as well as history of polyps.

## 2017-04-11 NOTE — PROGRESS NOTES
[address] [facility] [address]    (H) [phone]  (W)     Chief Complaint   Patient presents with   • Follow-up     History of Present Illness     The patient came back for follow visit today. The patient has history of nausea off and on for the last 3-4 months.  Nausea is sudden in onset, moderate in severity and frequency being 3-5 times a week.  The patient may feel nauseated for 2-3 hours.  There is no associated vomiting.  Nausea is worsened by eating.  There are no relieving factors of nausea.     The patient has a history of reflux off and on for the last several several months.  The reflux is moderately severe.  Symptoms are described as retrosternal burning sensation, and indigestion.  There is history of occasional regurgitative symptoms.  Frequency being several times per week.  The symptoms are worse at night.  The patient took Nexium 40 which although helped her symptoms however it caused her headaches.  Currently the patient is on Zantac with occasional breakthrough of her symptoms.  Of interest previously the patient has taken Nexium 20 mg once a day without significant headaches.      The patient has history of diarrhea off-and-on for the last 3-4 months.  Severity is mild to moderate, frequency of bowel movements being 3-4 times a day.  The stools are described as loose and at times watery.  Occasionally, there is a nocturnal element of diarrhea. The diarrhea is associated with tenesmus at times.  She denies further constipation. There is no history of overt GI bleed (Hematemesis, melena or hematochezia).  She denies further dysphagic symptoms.  The patient has lost 7 pounds over the last 6 weeks unintentionally.    Review of Systems   Constitutional: Negative for appetite change, chills, fatigue, fever and unexpected weight change.   HENT: Negative for mouth sores, nosebleeds and trouble swallowing.    Eyes: Negative for discharge and redness.   Respiratory: Negative for apnea, cough and  "shortness of breath.    Cardiovascular: Positive for palpitations. Negative for chest pain and leg swelling.   Gastrointestinal: Positive for diarrhea and nausea. Negative for abdominal distention, abdominal pain, anal bleeding, blood in stool, constipation and vomiting.   Endocrine: Negative for cold intolerance, heat intolerance and polydipsia.   Genitourinary: Negative for dysuria, hematuria and urgency.   Musculoskeletal: Negative for arthralgias, joint swelling and myalgias.   Skin: Negative for rash.   Allergic/Immunologic: Negative for food allergies and immunocompromised state.   Neurological: Negative for dizziness, seizures, syncope and headaches.   Hematological: Negative for adenopathy. Does not bruise/bleed easily.   Psychiatric/Behavioral: Negative for dysphoric mood. The patient is not nervous/anxious and is not hyperactive.      Patient Active Problem List   Diagnosis   • Right upper quadrant abdominal pain   • Periumbilical abdominal pain   • Heartburn   • Nausea   • Constipation   • Diarrhea   • Bright red blood per rectum   • Pharyngoesophageal dysphagia     Past Medical History:   Diagnosis Date   • Acid reflux    • Anxiety    • H/O exercise stress test 2011    STATES \"IT WAS OK\".  HAD DONE IN [place], [place]   • High cholesterol    • Hypothyroid 2006   • Snores    • Tattoo      Past Surgical History:   Procedure Laterality Date   • ABDOMINAL SURGERY  1995    lap   • ADENOIDECTOMY     • COLONOSCOPY N/A 3/10/2017    Procedure: COLONOSCOPY WITH COLD BXS, POLYPECTOMY;  Surgeon: Kane Wells MD;  Location: [facility] ENDOSCOPY;  Service:    • ENDOSCOPY N/A 3/10/2017    Procedure: ESOPHAGOGASTRODUODENOSCOPY WITH BXS;  Surgeon: Kane Wells MD;  Location: [facility] ENDOSCOPY;  Service:      Family History   Problem Relation Age of Onset   • Colon polyps Father    • ADRIANA disease Maternal Grandmother    • Diverticulitis Maternal Grandmother    • Hiatal hernia Maternal Grandmother    • Liver cancer Neg Hx  " "  • Liver disease Neg Hx    • Stomach cancer Neg Hx    • Esophageal cancer Neg Hx      Social History   Substance Use Topics   • Smoking status: Former Smoker     Packs/day: 1.00     Years: 1.00     Types: Cigarettes     Quit date: 3/7/2011   • Smokeless tobacco: Never Used      Comment: quit in 2011   • Alcohol use Yes      Comment: social       Current Outpatient Prescriptions:   •  citalopram (CeleXA) 20 MG tablet, Take 10 mg by mouth Daily., Disp: , Rfl:   •  promethazine (PHENERGAN) 25 MG tablet, Take 25 mg by mouth Every 6 (Six) Hours As Needed., Disp: , Rfl:   •  Thyroid 120 MG PO tablet, Take 120 mg by mouth Daily., Disp: , Rfl:   No Known Allergies  /76  Pulse 90  Temp 98.1 °F (36.7 °C)  Resp 19  Ht 63\" (160 cm)  Wt 172 lb (78 kg)  LMP 04/04/2017  BMI 30.47 kg/m2     Physical Exam   Constitutional: She is oriented to person, place, and time. She appears well-developed and well-nourished. No distress.   HENT:   Head: Normocephalic and atraumatic.   Right Ear: Hearing and external ear normal.   Left Ear: Hearing and external ear normal.   Nose: Nose normal.   Mouth/Throat: Oropharynx is clear and moist and mucous membranes are normal. Mucous membranes are not pale, not dry and not cyanotic. No oral lesions. No oropharyngeal exudate.   Eyes: Conjunctivae and EOM are normal. Right eye exhibits no discharge. Left eye exhibits no discharge. No scleral icterus.   Neck: Trachea normal. Neck supple. No JVD present. No edema present. No thyroid mass and no thyromegaly present.   Cardiovascular: Normal rate, regular rhythm, S2 normal and normal heart sounds.  Exam reveals no gallop, no S3 and no friction rub.    No murmur heard.  Pulmonary/Chest: Effort normal and breath sounds normal. No respiratory distress. She has no wheezes. She has no rales. She exhibits no tenderness.   Abdominal: Soft. Normal appearance and bowel sounds are normal. She exhibits no distension, no ascites and no mass. There is no " splenomegaly or hepatomegaly. There is no tenderness. There is no rigidity, no rebound and no guarding. No hernia.   Musculoskeletal: She exhibits no tenderness or deformity.       Vascular Status -  Her exam exhibits no right foot edema. Her exam exhibits no left foot edema.  Lymphadenopathy:     She has no cervical adenopathy.        Left: No supraclavicular adenopathy present.   Neurological: She is alert and oriented to person, place, and time. She has normal strength. No cranial nerve deficit or sensory deficit. She exhibits normal muscle tone. Coordination normal.   Skin: No rash noted. She is not diaphoretic. No cyanosis. No pallor. Nails show no clubbing.   Psychiatric: She has a normal mood and affect. Her behavior is normal. Judgment and thought content normal.   Nursing note and vitals reviewed.    Laboratory Testing:  Upon review of medical records:    Dated 12/22/2016 H. pylori IgM antibody:<9.0    Dated March 6, 2017  sodium 142 potassium 4.1 chloride 104 CO2 25.7 BUN 11 serum creatinine 0.78 glucose 103.  Calcium 9.3.  Albumin 4.4.  T bili 0.3 AST 27 ALT 39 alkaline phosphatase 95.  Lipase 173.  TSH < 0.0.2.  WBC 9.57 hemoglobin 12.7 hematocrit 37.9 MCV 87.5 platelet count  347.     Abdominal Imaging:   Upon review of records:    Dated 12/14/2016 Ultrasound reveals liver parenchyma is homogenous. There are no focal masses identified. The gallbladder appears within normal limits. There are no echogenic foci to suggest stones. There is no gallbladder wall thickening or pericholecystic fluid. The common duct is within normal limits arguing against biliary dilatation.     Procedures:  Upon review of medical records:    Dated March 10, 2017 the patient underwent a colonoscopy to the terminal ileum which revealed: Focal erythema, and scant erosion within the terminal ileum-terminal ileitis.  Scant early diverticular change and left colon.  Colon polyps.  Internal hemorrhoids.  No endoscopic evidence of  colitis was seen.  Random biopsies were obtained from the colon polyp withdrawal of scope.  Terminal ileum, biopsies revealed focal active ileitis.  No evidence of significant crypt architectural distortion, dysplasia or malignancy.  Random colon biopsies revealed fragments of benign colonic mucosa with prominent lymphoid aggregates, otherwise no specific pathologic diagnosis.  No evidence of acute inflammation, chronic colitis, microscopic colitis, dysplasia or malignancy.  Descending colon polyps, biopsy revealed sessile serrated adenoma.  No evidence of cytologic dysplasia or malignancy.  Prominent lymphoid aggregates.  Sigmoid colon polyps, biopsy revealed fragments of hyperplastic polyp.  No evidence of dysplasia or malignancy.    Dated March 10, 2017 the patient underwent an upper endoscopy which revealed: Erythematous distal esophagitis.  No Hernandez’s esophagus.  Small sliding hiatal hernia less than 3 cm.  Erythematous gastritis with evidence of healed ulceration.  Mild erythematous bulbar duodenitis.  Second portion of duodenum, biopsies revealed nonspecific chronic duodenitis with increased of plasma cells within the lamina propria Ken gland hyperplasia.  Very mild increased in intraepithelial lymphocytes.  No evidence of villous blunting, acute inflammation, dysplasia or malignancy.  Gastric polyps, biopsy revealed benign fundic gland polyps.  No evidence of dysplasia or malignancy.  Antrum, angulus, greater curve, biopsies revealed mild chronic gastritis.  No evidence of acute inflammation, dysplasia, malignancy or Helicobacter pylori on H&E.    Assessment and Plan:      Kanika was seen today for follow-up.    Diagnoses and all orders for this visit:    Diarrhea, unspecified type  Comments:  Focal ileitis.  The patient was also noted to have mild increase in intraepithelial lymphocytes in the duodenum.  No villous blunting was seen.  Orders:  -     IgA  -     Tissue Transglutaminase, IgA  -      C-reactive Protein    Nausea  Comments:  Possible pancreatobiliary disease.  Orders:  -     Lipase; Standing    Heartburn  Comments:  Improved on H2 blockers.    Colon polyp  Comments:  Serrated adenoma    Other orders  -     Cancel: US Abdomen Complete          Discussion:       Plan     Patient Instructions     1. Antireflux measures.  2. Low-fat-moderate lactose-moderate fiber diet.  3. Regular low impact exercise and weight loss.  4. Dose adjustment may be considered in terms of thyroid supplements.  5. Brief trial of Nexium 20 mg one by mouth every morning one half hour before breakfast.  6. The patient should take thyroid medication first thing in the morning, then wait for half hour then take Nexium then wait for another half hour and then eat.   7. Follow-up colonoscopy is recommended in 3 years-March 2020 to reevaluate the terminal ileum as well as history of polyps.      Patient Care Team:  RAMILA Garcia as PCP - General (Family Medicine)    Kane Wells MD

## 2017-05-30 ENCOUNTER — OFFICE VISIT (OUTPATIENT)
Dept: GASTROENTEROLOGY | Facility: CLINIC | Age: 32
End: 2017-05-30

## 2017-05-30 VITALS
DIASTOLIC BLOOD PRESSURE: 78 MMHG | BODY MASS INDEX: 29.41 KG/M2 | HEIGHT: 63 IN | RESPIRATION RATE: 19 BRPM | SYSTOLIC BLOOD PRESSURE: 113 MMHG | WEIGHT: 166 LBS | TEMPERATURE: 97.5 F | HEART RATE: 87 BPM

## 2017-05-30 DIAGNOSIS — R12 HEARTBURN: ICD-10-CM

## 2017-05-30 DIAGNOSIS — K50.00 TERMINAL ILEITIS, WITHOUT COMPLICATIONS (HCC): ICD-10-CM

## 2017-05-30 DIAGNOSIS — R19.7 DIARRHEA, UNSPECIFIED TYPE: Primary | ICD-10-CM

## 2017-05-30 PROCEDURE — 99214 OFFICE O/P EST MOD 30 MIN: CPT | Performed by: INTERNAL MEDICINE

## 2017-05-30 RX ORDER — PROPRANOLOL HYDROCHLORIDE 10 MG/1
TABLET ORAL
COMMUNITY
Start: 2017-04-20

## 2017-06-01 ENCOUNTER — TELEPHONE (OUTPATIENT)
Dept: GASTROENTEROLOGY | Facility: CLINIC | Age: 32
End: 2017-06-01

## 2017-06-01 ENCOUNTER — HOSPITAL ENCOUNTER (OUTPATIENT)
Dept: CT IMAGING | Facility: HOSPITAL | Age: 32
Discharge: HOME | End: 2017-06-01
Attending: INTERNAL MEDICINE | Admitting: INTERNAL MEDICINE

## 2017-06-01 PROCEDURE — 74177 CT ABD & PELVIS W/CONTRAST: CPT

## 2017-06-01 PROCEDURE — 0 IOPAMIDOL 61 % SOLUTION: Performed by: INTERNAL MEDICINE

## 2017-06-01 RX ADMIN — BARIUM SULFATE 1800 ML: 1 SUSPENSION ORAL at 13:15

## 2017-06-01 RX ADMIN — IOPAMIDOL 96 ML: 612 INJECTION, SOLUTION INTRAVENOUS at 13:15

## 2017-06-01 NOTE — TELEPHONE ENCOUNTER
----- Message from Shannan Obregon MA sent at 5/31/2017  7:51 PM EDT -----        ----- Message -----     From: RAMILA Merritt     Sent: 5/31/2017   8:46 AM       To: Shannan Obregon MA        ----- Message -----     From: Jasmyn Stone MA     Sent: 5/31/2017   8:32 AM       To: RAMILA Merritt    Patient was in the office yesterday and states that she forgot to ask about getting some hemorrhoid cream prescribed. Please call to discuss if that is something we can prescribe. Thanks!      Called and discussed with patient. Recommended to use Cortizone 10 ointment up to 4 times per day for 1 week then use PRN if hemorrhoids are swollen. Patient verbalizes understanding and will try this. Will call back if further problems.

## 2017-06-02 ENCOUNTER — TELEPHONE (OUTPATIENT)
Dept: GASTROENTEROLOGY | Facility: CLINIC | Age: 32
End: 2017-06-02

## 2017-06-02 DIAGNOSIS — R19.7 DIARRHEA, UNSPECIFIED TYPE: Primary | ICD-10-CM

## 2017-06-02 RX ORDER — GLYCOPYRROLATE 2 MG/1
TABLET ORAL
Qty: 15 TABLET | Refills: 1 | Status: SHIPPED | OUTPATIENT
Start: 2017-06-02

## 2017-06-02 NOTE — TELEPHONE ENCOUNTER
----- Message from Yanelis Mejia sent at 6/2/2017  8:54 AM EDT -----  Contact: PATIENT  PATIENT SAID DR. YEUNG WAS TO RX SANIYA CAMILO.  IT IS IN HIS NOTE.  CAN YOU SEND RX TO PHARMACY ?

## 2017-11-15 ENCOUNTER — TELEPHONE (OUTPATIENT)
Dept: GASTROENTEROLOGY | Facility: CLINIC | Age: 32
End: 2017-11-15

## (undated) DEVICE — CONMED SCOPE SAVER BITE BLOCK, 20X27 MM: Brand: SCOPE SAVER

## (undated) DEVICE — Device

## (undated) DEVICE — PAD GRND REM POLYHESIVE A/ DISP

## (undated) DEVICE — ENDOGATOR AUXILIARY WATER JET CONNECTOR: Brand: ENDOGATOR

## (undated) DEVICE — JELLY,LUBE,STERILE,FLIP TOP,TUBE,2-OZ: Brand: MEDLINE

## (undated) DEVICE — SYR PREFIL W/SALINE FLSH 10ML

## (undated) DEVICE — FRCP BIOP COLD ENDOJAW ALLGTR W/NDL 2.8X2300MM BLU

## (undated) DEVICE — 2000CC GUARDIAN II: Brand: GUARDIAN